# Patient Record
Sex: FEMALE | Race: WHITE
[De-identification: names, ages, dates, MRNs, and addresses within clinical notes are randomized per-mention and may not be internally consistent; named-entity substitution may affect disease eponyms.]

---

## 2018-01-09 LAB
ALBUMIN SERPL-MCNC: 4 GM/DL (ref 3.4–5)
BASOPHILS # BLD: 0.01 K/UL (ref 0–0.2)
BASOPHILS NFR BLD: 0.2 %
BUN SERPL-MCNC: 13 MG/DL (ref 7–18)
CALCIUM SERPL-MCNC: 9.7 MG/DL (ref 8.5–10.1)
CO2 SERPL-SCNC: 29 MMOL/L (ref 21–32)
CREAT SERPL-MCNC: 0.99 MG/DL (ref 0.6–1.2)
EOS ABS #: 0.14 K/UL (ref 0–0.5)
EOSINOPHIL NFR BLD AUTO: 225 K/UL (ref 130–400)
GLUCOSE SERPL-MCNC: 168 MG/DL (ref 70–99)
HBA1C MFR BLD: 5.7 % (ref 4.5–5.6)
HCT VFR BLD CALC: 38.5 % (ref 37–47)
HGB BLD-MCNC: 13.3 G/DL (ref 12–16)
IG#: 0.02 K/UL (ref 0–0.02)
IMM GRANULOCYTES NFR BLD AUTO: 27.6 %
INR PPP: 1 (ref 0.9–1.1)
LYMPHOCYTES # BLD: 1.67 K/UL (ref 1.2–3.4)
MCH RBC QN AUTO: 31.3 PG (ref 25–34)
MCHC RBC AUTO-ENTMCNC: 34.5 G/DL (ref 32–36)
MCV RBC AUTO: 90.6 FL (ref 80–100)
MONO ABS #: 0.4 K/UL (ref 0.11–0.59)
MONOCYTES NFR BLD: 6.6 %
NEUT ABS #: 3.82 K/UL (ref 1.4–6.5)
NEUTROPHILS # BLD AUTO: 2.3 %
NEUTROPHILS NFR BLD AUTO: 63 %
PMV BLD AUTO: 10.2 FL (ref 7.4–10.4)
POTASSIUM SERPL-SCNC: 3.6 MMOL/L (ref 3.5–5.1)
PTT PATIENT: 27.5 SECONDS (ref 21–31)
RED CELL DISTRIBUTION WIDTH CV: 13.6 % (ref 11.5–14.5)
RED CELL DISTRIBUTION WIDTH SD: 44.9 FL (ref 36.4–46.3)
SODIUM SERPL-SCNC: 136 MMOL/L (ref 136–145)
WBC # BLD AUTO: 6.06 K/UL (ref 4.8–10.8)

## 2018-01-09 NOTE — DIAGNOSTIC IMAGING REPORT
CHEST 2 VIEWS ROUTINE



CLINICAL HISTORY: PAT preoperative evaluation



COMPARISON STUDY:  No previous studies for comparison.



FINDINGS: The bones soft tissues and hemidiaphragms are normal. The

cardiomediastinal silhouette is normal. The lungs are clear. The pulmonary

vasculature is normal. 



IMPRESSION:  Negative chest. 











The above report was generated using voice recognition software.  It may contain

grammatical, syntax or spelling errors.









Electronically signed by:  Enrique Fernández M.D.

1/9/2018 2:02 PM



Dictated Date/Time:  1/9/2018 2:01 PM

## 2018-01-16 NOTE — HISTORY & PHYSICAL EXAMINATION
DATE OF ADMISSION:  02/05/2018

 

CHIEF COMPLAINT:  Left knee pain.

 

HISTORY OF PRESENT ILLNESS:  Mr. Gutierrez is a 67-year-old female with a

3-year history of left knee pain.  The patient rates her pain at 8/10.  She

has pain with her daily activities.  She has limited standing and walking

tolerance.  Pain is worse with weightbearing.  The patient has had previous

knee arthroscopy, injections and anti-inflammatories without relief.  She has

failed conservative treatment and is scheduled for left knee replacement.

 

PAST MEDICAL HISTORY:  Hypertension and hypercholesterolemia.  She denies

heart disease, diabetes or DVT.

 

PAST SURGICAL HISTORY:  Tonsillectomy and knee arthroscopy.

 

SOCIAL HISTORY:  The patient denies alcohol or tobacco use.  She lives in a

single story home.  She is  and retired.

 

FAMILY HISTORY:  Negative for DVT.

 

MEDICATIONS:  Omeprazole 20 mg daily, simvastatin 5 mg daily, omega 3 fish

oil 1000 mg daily, aspirin 81 mg, and lisinopril 10 mg.

 

ALLERGIES:  KENALOG.

 

REVIEW OF SYSTEMS:  See HPI.  Ten other systems reviewed, all negative.

 

PHYSICAL EXAMINATION:

VITAL SIGNS:  Height 5 feet 4 inches, weight 187 pounds, and BMI is 32.

GENERAL:  This is a well-developed and well-nourished female who is alert and

oriented x3.  Mood and affect are appropriate.

HEENT:  Normocephalic and atraumatic.  Mucous membranes are moist and intact.

NECK:  Supple without lymphadenopathy.

HEART:  Regular rate and rhythm without murmurs, rubs or gallops.

LUNGS:  Clear to auscultation without wheezes or rhonchi.

ABDOMEN:  Soft and nontender.  Bowel sounds are equal and active.

EXTREMITIES:  No ecchymosis, redness or warmth.  She has varus deformity. 

Range of motion is from 0-110 degrees with no laxity.  She has mild effusion.

 She is neurovascularly intact.

 

X-RAY EXAMINATION:  AP and lateral views show joint space narrowing and

osteophyte formation.

 

IMPRESSION:  Degenerative joint disease, left knee.

 

PLAN:  The patient will be admitted for a left total knee arthroplasty.  We

will plan on aspirin for DVT prophylaxis.  The patient will have [facility]

for postop PT.  A referral has been placed preoperatively.

## 2018-02-05 ENCOUNTER — HOSPITAL ENCOUNTER (INPATIENT)
Dept: HOSPITAL 45 - C.ACU | Age: 68
LOS: 1 days | Discharge: HOME HEALTH | DRG: 470 | End: 2018-02-06
Payer: COMMERCIAL

## 2018-02-05 VITALS
HEIGHT: 63 IN | WEIGHT: 185.63 LBS | BODY MASS INDEX: 32.89 KG/M2 | BODY MASS INDEX: 32.89 KG/M2 | WEIGHT: 185.63 LBS | HEIGHT: 63 IN

## 2018-02-05 VITALS
DIASTOLIC BLOOD PRESSURE: 92 MMHG | HEART RATE: 81 BPM | SYSTOLIC BLOOD PRESSURE: 148 MMHG | OXYGEN SATURATION: 97 % | TEMPERATURE: 97.52 F

## 2018-02-05 VITALS
TEMPERATURE: 98.06 F | OXYGEN SATURATION: 100 % | HEART RATE: 81 BPM | DIASTOLIC BLOOD PRESSURE: 82 MMHG | SYSTOLIC BLOOD PRESSURE: 126 MMHG

## 2018-02-05 VITALS — SYSTOLIC BLOOD PRESSURE: 132 MMHG | HEART RATE: 81 BPM | DIASTOLIC BLOOD PRESSURE: 85 MMHG | OXYGEN SATURATION: 100 %

## 2018-02-05 VITALS — DIASTOLIC BLOOD PRESSURE: 82 MMHG | OXYGEN SATURATION: 100 % | HEART RATE: 88 BPM | SYSTOLIC BLOOD PRESSURE: 136 MMHG

## 2018-02-05 VITALS — HEART RATE: 69 BPM | DIASTOLIC BLOOD PRESSURE: 69 MMHG | SYSTOLIC BLOOD PRESSURE: 115 MMHG | TEMPERATURE: 97.52 F

## 2018-02-05 VITALS
OXYGEN SATURATION: 98 % | TEMPERATURE: 97.52 F | HEART RATE: 64 BPM | SYSTOLIC BLOOD PRESSURE: 114 MMHG | DIASTOLIC BLOOD PRESSURE: 70 MMHG

## 2018-02-05 VITALS — OXYGEN SATURATION: 99 %

## 2018-02-05 VITALS
SYSTOLIC BLOOD PRESSURE: 164 MMHG | DIASTOLIC BLOOD PRESSURE: 97 MMHG | TEMPERATURE: 98.24 F | HEART RATE: 76 BPM | OXYGEN SATURATION: 99 %

## 2018-02-05 VITALS — OXYGEN SATURATION: 100 % | SYSTOLIC BLOOD PRESSURE: 130 MMHG | HEART RATE: 80 BPM | DIASTOLIC BLOOD PRESSURE: 80 MMHG

## 2018-02-05 VITALS — OXYGEN SATURATION: 98 %

## 2018-02-05 DIAGNOSIS — I10: ICD-10-CM

## 2018-02-05 DIAGNOSIS — E78.5: ICD-10-CM

## 2018-02-05 DIAGNOSIS — Z91.048: ICD-10-CM

## 2018-02-05 DIAGNOSIS — M17.12: Primary | ICD-10-CM

## 2018-02-05 PROCEDURE — 0SRD0J9 REPLACEMENT OF LEFT KNEE JOINT WITH SYNTHETIC SUBSTITUTE, CEMENTED, OPEN APPROACH: ICD-10-PCS

## 2018-02-05 RX ADMIN — CEFAZOLIN SCH MLS/MIN: 10 INJECTION, POWDER, FOR SOLUTION INTRAVENOUS at 16:57

## 2018-02-05 RX ADMIN — ACETAMINOPHEN SCH MG: 500 TABLET, COATED ORAL at 21:00

## 2018-02-05 RX ADMIN — TRANEXAMIC ACID SCH MLS/MIN: 100 INJECTION, SOLUTION INTRAVENOUS at 06:30

## 2018-02-05 RX ADMIN — Medication SCH MG: at 20:59

## 2018-02-05 RX ADMIN — FERROUS GLUCONATE SCH MG: 324 TABLET ORAL at 12:30

## 2018-02-05 RX ADMIN — DEXTROSE MONOHYDRATE, SODIUM CHLORIDE, AND POTASSIUM CHLORIDE SCH MLS/HR: 50; 4.5; 1.49 INJECTION, SOLUTION INTRAVENOUS at 22:22

## 2018-02-05 RX ADMIN — DOCUSATE SODIUM SCH MG: 100 CAPSULE, LIQUID FILLED ORAL at 20:59

## 2018-02-05 RX ADMIN — OXYCODONE HYDROCHLORIDE PRN MG: 5 TABLET ORAL at 11:59

## 2018-02-05 RX ADMIN — ACETAMINOPHEN SCH MG: 500 TABLET, COATED ORAL at 15:02

## 2018-02-05 RX ADMIN — TRANEXAMIC ACID SCH MLS/MIN: 100 INJECTION, SOLUTION INTRAVENOUS at 08:45

## 2018-02-05 RX ADMIN — DEXTROSE MONOHYDRATE, SODIUM CHLORIDE, AND POTASSIUM CHLORIDE SCH MLS/HR: 50; 4.5; 1.49 INJECTION, SOLUTION INTRAVENOUS at 12:29

## 2018-02-05 RX ADMIN — FERROUS GLUCONATE SCH MG: 324 TABLET ORAL at 17:02

## 2018-02-05 NOTE — ANESTHESIOLOGY PROGRESS NOTE
Anesthesia Post Op Note


Date & Time


Feb 5, 2018 at 11:25





Vital Signs


Pain Intensity:  0





Vital Signs Past 12 Hours








  Date Time  Temp Pulse Resp B/P (MAP) Pulse Ox O2 Delivery O2 Flow Rate FiO2


 


2/5/18 11:20  87 18 164/94 100 Nasal Cannula 2 


 


2/5/18 11:10  85 19 139/102 100 Oxymask 10 


 


2/5/18 11:00  94 15 148/93 99 Oxymask 10 


 


2/5/18 10:50 36.0 98 15 148/83 98 Oxymask 10 


 


2/5/18 07:48 36.8 76 18 164/97 99 Room Air  











Notes


Mental Status:  alert / awake / arousable, participated in evaluation


Pt Amnestic to Procedure:  Yes


Nausea / Vomiting:  adequately controlled


Pain:  adequately controlled


Airway Patency, RR, SpO2:  stable & adequate


BP & HR:  stable & adequate


Hydration State:  stable & adequate


Anesthetic Complications:  no major complications apparent

## 2018-02-05 NOTE — OPERATIVE REPORT
DATE OF OPERATION:  02/05/2018

 

PREOPERATIVE DIAGNOSIS:  Osteoarthritis left knee.

 

POSTOPERATIVE DIAGNOSIS:  Osteoarthritis left knee.

 

PROCEDURE:  Left total knee arthroplasty.

 

SURGEON:  Jung Olivier MD.

 

ASSISTANT:  BUZZ Rosales.

 

ANESTHESIA:  General.

 

COMPLICATIONS:  None.

 

IMPLANTS USED:  Femoral size 4, tibial size 4, poly 11, patella size 36.

 

OPERATION AND FINDINGS:  Following induction of general anesthesia, the

patient's left leg was prepped and draped in the usual sterile manner. Limb

was exsanguinated with an Esmarch bandage and tourniquet was inflated to 350

mmHg. A longitudinal incision was made anteriorly. Subcutaneous tissue was

sharply dissected.  Electrocautery was used for hemostasis. Prepatellar bursa

was incised and median parapatellar incision was performed. Patella was

everted and the knee was flexed. Fat pad was removed to aid in visualization

and the anterior and posterior cruciate ligaments were removed. The medial

face of the tibia was cleared of soft tissue first with a Bovie and a Ochoa

elevator. This tissue was retracted posteriorly using a blunt Hohmann. A

Spears retractor was used to expose the synovium above on the anterior

aspect of the femur and this was removed down to bone. The PSI guide was

placed on the distal femur and two pins were placed anteriorly and kept in

position and two additional pins were placed distally and removed. The distal

femoral cutting block was placed in position and the distal femoral cut was

used in the +0 setting.

 

Next, the cutting block was removed and the size 4 block was placed in the

distal end of the femur. Care was taken to ensure appropriate external

rotation and feeler gauge was used to ensure no notching would occur. The

femoral block was centered on the distal femur and in the medial and lateral

direction and was fixed using two bone screws. The gold pins were then

removed.  The oscillating saw was used to create the bone cuts and the distal

femoral cutting block was removed and the reciprocating saw was used to

further trim the femoral cuts as well as a deep in the area for the trochlear

groove. Next, posterior condyle remnants were removed. Following this, a

meniscal clamp and knife were utilized to remove the anterior portion of both

medial and lateral meniscus.  The proximal tibia PSI guide was placed into

position and the proximal tibial cutting guide was screwed into position. The

extra medullary alignment guide was utilized to ensure appropriate alignment.

 The proximal tibia was cut and the proximal tibial cutting block was removed

and this bone fragment was removed. The appropriate guide was used to perform

the notch cut on the distal femur and a lamina  and a cochlear knife

were utilized to finish both medial and lateral meniscectomies to remove any

remnants of the posterior or anterior cruciate ligaments. Following this, the

distal femoral component was impacted into position and blunt Milla was used

to sublux the tibia anteriorly. The proximal tibia was sized and a size 4

tibial tray was chosen as the size to be used. This was put into position and

appropriate external rotation and a double check with extramedullary

alignment guide was performed.  The canal for the tibial stem was prepared

first with a 17 mm drill and then the punch and a mallet and the trial tibial

poly was placed. A 11 poly was chosen the size to be used. It was brought to

extension and the patella was prepared with the patellar reamer. A size 36

component was chosen the size to be used.  The trial component was placed and

knee was taken through a full range of motion and there was found to be no

lateral subluxation of the tibia. No lateral release was required. The trials

were all removed. The final components were obtained and assembled. Cement

was mixed. The knee was thoroughly irrigated and the ortho mix was injected

about the knee joint.  The final components were cemented into position. 

After thoroughly suctioning and drying the bone ends, all excess cement was

removed.  The knee was held in extension while the cement hardened.  The

wound was irrigated and closed over a Hemovac drain. #1 Vicryl was used to

close the extensor mechanism.  Subcutaneous tissues closed using 0 Dexon.

Skin was closed with staples. Sterile dressing of Adaptic, 4 x 4's, sterile

Webril, and Ace was applied.  The patient tolerated the procedure well.

 

Due to the complex nature of the procedure, the entire surgery was performed

with the operational assistance of BUZZ Rosales.  The assistant, under

direct supervision, was involved in the actual performance of all aspects of

the surgical procedure including hemostasis, tissue retraction and incision,

instrument management, patient positioning, and wound closure.

 

 

I attest to the content of the Intraoperative Record and any orders documented therein. Any exception
s are noted below.

## 2018-02-05 NOTE — [facility] POST OPERATIVE BRIEF NOTE
Immediate Operative Summary


Operative Date


Feb 5, 2018.





Pre-Operative Diagnosis





Left Knee Degenerative Joint Disease





Post-Operative Diagnosis





Same as preop





Procedure(s) Performed





Left Total Knee Arthroplasty





Surgeon


Dr. Olivier





Assistant Surgeon(s)


Jet Hubbard PA-C





Estimated Blood Loss


10 ml





Findings


Consistent with Post-Op Diagnosis





Specimens





A. Left Knee Bone and Tissue





Anesthesia Type


MAC Spinal Regional





Complication(s)


none





Disposition


Accompanied Pt To Recover:  no


Disposition:  Recovery Room / PACU

## 2018-02-05 NOTE — DIAGNOSTIC IMAGING REPORT
L KNEE 1 OR 2 VIEWS ROUTINE



CLINICAL HISTORY: AP/LATERAL IN PACU LEFT KNEE joint replacement



COMPARISON: None.



DISCUSSION: Anatomic alignment status post total left knee arthroplasty. Good

contact between prosthetic and underlying bone. Expected soft tissue

postoperative change.



IMPRESSION: Anatomic alignment status post total left knee arthroplasty.











The above report was generated using voice recognition software.  It may contain

grammatical, syntax or spelling errors.







Electronically signed by:  Enrique Fernández M.D.

2/5/2018 11:38 AM



Dictated Date/Time:  2/5/2018 11:37 AM

## 2018-02-06 VITALS
DIASTOLIC BLOOD PRESSURE: 72 MMHG | OXYGEN SATURATION: 99 % | HEART RATE: 71 BPM | TEMPERATURE: 98.24 F | SYSTOLIC BLOOD PRESSURE: 112 MMHG

## 2018-02-06 VITALS
TEMPERATURE: 98.24 F | OXYGEN SATURATION: 99 % | HEART RATE: 71 BPM | SYSTOLIC BLOOD PRESSURE: 112 MMHG | DIASTOLIC BLOOD PRESSURE: 72 MMHG

## 2018-02-06 VITALS
TEMPERATURE: 97.7 F | HEART RATE: 67 BPM | DIASTOLIC BLOOD PRESSURE: 70 MMHG | SYSTOLIC BLOOD PRESSURE: 125 MMHG | OXYGEN SATURATION: 95 %

## 2018-02-06 LAB
BUN SERPL-MCNC: 15 MG/DL (ref 7–18)
CALCIUM SERPL-MCNC: 8.6 MG/DL (ref 8.5–10.1)
CO2 SERPL-SCNC: 25 MMOL/L (ref 21–32)
CREAT SERPL-MCNC: 0.79 MG/DL (ref 0.6–1.2)
EOSINOPHIL NFR BLD AUTO: 200 K/UL (ref 130–400)
GLUCOSE SERPL-MCNC: 133 MG/DL (ref 70–99)
HCT VFR BLD CALC: 28.7 % (ref 37–47)
HGB BLD-MCNC: 10.1 G/DL (ref 12–16)
MCH RBC QN AUTO: 31.4 PG (ref 25–34)
MCHC RBC AUTO-ENTMCNC: 35.2 G/DL (ref 32–36)
MCV RBC AUTO: 89.1 FL (ref 80–100)
PMV BLD AUTO: 10.2 FL (ref 7.4–10.4)
POTASSIUM SERPL-SCNC: 4.1 MMOL/L (ref 3.5–5.1)
RED CELL DISTRIBUTION WIDTH CV: 12.9 % (ref 11.5–14.5)
RED CELL DISTRIBUTION WIDTH SD: 42 FL (ref 36.4–46.3)
SODIUM SERPL-SCNC: 133 MMOL/L (ref 136–145)
WBC # BLD AUTO: 12.18 K/UL (ref 4.8–10.8)

## 2018-02-06 RX ADMIN — FERROUS GLUCONATE SCH MG: 324 TABLET ORAL at 08:30

## 2018-02-06 RX ADMIN — DOCUSATE SODIUM SCH MG: 100 CAPSULE, LIQUID FILLED ORAL at 09:13

## 2018-02-06 RX ADMIN — OXYCODONE HYDROCHLORIDE PRN MG: 5 TABLET ORAL at 10:43

## 2018-02-06 RX ADMIN — ACETAMINOPHEN SCH MG: 500 TABLET, COATED ORAL at 13:03

## 2018-02-06 RX ADMIN — ACETAMINOPHEN SCH MG: 500 TABLET, COATED ORAL at 05:51

## 2018-02-06 RX ADMIN — FERROUS GLUCONATE SCH MG: 324 TABLET ORAL at 13:03

## 2018-02-06 RX ADMIN — CEFAZOLIN SCH MLS/MIN: 10 INJECTION, POWDER, FOR SOLUTION INTRAVENOUS at 03:33

## 2018-02-06 RX ADMIN — Medication SCH MG: at 13:02

## 2018-02-06 RX ADMIN — OXYCODONE HYDROCHLORIDE PRN MG: 5 TABLET ORAL at 16:11

## 2018-02-06 RX ADMIN — DEXTROSE MONOHYDRATE, SODIUM CHLORIDE, AND POTASSIUM CHLORIDE SCH MLS/HR: 50; 4.5; 1.49 INJECTION, SOLUTION INTRAVENOUS at 08:10

## 2018-02-06 NOTE — ANESTHESIOLOGY PROGRESS NOTE
Anesthesia Post Op Note


Date & Time


Feb 6, 2018 at 11:04





Vital Signs





Vital Signs Past 12 Hours








  Date Time  Temp Pulse Resp B/P (MAP) Pulse Ox O2 Delivery O2 Flow Rate FiO2


 


2/6/18 07:30      Room Air  


 


2/6/18 07:16 36.8 71 18 112/72 (85) 99 Room Air  


 


2/6/18 03:06 36.5 67 17 125/70 (88) 95 Room Air  


 


2/5/18 23:36     98 Room Air  


 


2/5/18 23:05 36.4 64 17 114/70 (85) 98 Room Air  











Notes


Mental Status:  alert / awake / arousable, participated in evaluation


Pt Amnestic to Procedure:  Yes


Nausea / Vomiting:  adequately controlled


Pain:  adequately controlled


Airway Patency, RR, SpO2:  stable & adequate


BP & HR:  stable & adequate


Hydration State:  stable & adequate


Anesthetic Complications:  no major complications apparent

## 2018-02-06 NOTE — DISCHARGE INSTRUCTIONS
Discharge Instructions


Date of Service


2018.





Admission


Reason for Admission:  Left Knee Osteoarthritis





Discharge


Discharge Diagnosis / Problem:  Left Knee Djd





Discharge Goals


Goal(s):  Decrease discomfort, Improve function, Increase independence





Activity Recommendations


Activity Limitations:  per Instructions/Follow-up section


Weightbearing Status:  Left weightbearing (as tolerated)





.





Instructions / Follow-Up


Instructions / Follow-Up


ACTIVITY RECOMMENDATIONS:





SELF CARE INSTRUCTIONS AFTER TOTAL KNEE REPLACEMENT





A.  You may need to continue a physical therapy program after discharge from 

the hospital.  There are several options available to you. 


      Your doctor will assist you in selecting the best one for you.





   1.  An out-patient facility 2 to 3 times a week for therapy or home therapy.


   2.  Continue working on all exercises taught to you in the hospital.  Your


                 goals should be to increase bending of your knee to 90 degrees 

and


                 beyond and to fully straighten your knee.





B.  You may progress at your own pace from walking with a walker or crutches to 

a cane; then to no assistive devices.





C.   Make walking a part of your daily routine.  Be up as much as comfortable 

with rest periods throughout the day.  


      Rest with leg elevation is very important. 


      Use the ice wrap frequently for the first 3-4 weeks.





D.  There are no restrictions on activities.  You may ride in a car, shop, 

participate in household chores and all social activities.





E.  Wear the long elastic stockings (INEZ hose) 20 hours a day for 2 weeks after 

surgery.  


     They can be removed several times a day for laundering and for a bath.





F.  You may shower, no tub baths until cleared by your doctor.








SPECIAL CARE INSTRUCTIONS:





**VERY IMPORTANT TO READ AND REVIEW**





A.  There are a few signs you need to watch for after you are home.  Call  

[facility] [place] if you notice any of the followin.  Increased severe knee pain.  Some pain is expected especially  when you 

exercise.


   2.  Increased swelling in your leg or knee; pain or swelling of the calf 

muscle in either lower leg.


   3.  Any fluid drainage from the incision.


   4.  Shortness of breath or chest pain.





B.  Please call [facility] at [phone] if you have any  

concerns or questions about your operation or recovery.  


     The doctor or his nurse will return your call promptly.





C.  You must take antibiotics before dental work, bladder, bowel or other 

surgery.  


      Your doctor will provide you with a permanent care to carry describing 

this precaution.





IMPORTANT:





*  REMEMBER TO TAKE ASPIRIN, 81 MG, TWICE DAILY FOR 4 WEEKS UNLESS OTHERWISE 

DIRECTED.  


   THIS IS YOUR BLOOD THINNER.





*  HIGH RISK PATIENTS MAY BE PRESCRIBED A STRONGER BLOOD THINNER.  


   THIS WILL BE PROVIDED AT DISCHARGE.





*  CALL IF INCREASED PAIN, REDNESS, DRAINAGE OR FEVER GREATER THAT 101.





*  WEAR INEZ HOSE 20 HOURS PER DAY FOR 2 WEEKS.





*  You have a Zipline closure system on your wound.  This will remain on for 14 

days.  Keep the wound covered with gauze (4x4's or similar) to protect the 

wound.


    It will also protect the Zipline from getting caught on your clothes.  You 

may shower in 72 hours.  Do not soak the wound.  No tub baths.  Clean around 


    the wound but do not scrub it.  Call the office [phone] if the wound 

appears to be opening or if the Zipline 


    is coming off before it's time.








FOLLOW UP VISIT:





If appointment is not already scheduled:





Please call [facility] [place] to make a follow-up appointment for 


2 weeks after your surgery at [phone].





Current Hospital Diet


Patient's current hospital diet: Regular Diet





Discharge Diet


Recommended Diet:  Regular Diet





Procedures


Procedures Performed:  


Left Total Knee Arthroplasty





Pending Studies


Studies pending at discharge:  no





Laboratory Results





Hemoglobin A1c








Test


  18


13:33 Range/Units


 


 


Estimated Average Glucose 117   mg/dl


 


Hemoglobin A1c 5.7 H 4.5-5.6  %











Medical Emergencies








.


Who to Call and When:





Medical Emergencies:  If at any time you feel your situation is an emergency, 

please call [phone] immediately.





.





Non-Emergent Contact


Non-Emergency issues call your:  Surgeon


Call Non-Emergent contact if:  temperature is above 101.5, your pain is not 

controlled, your pain is worsening, wound has increased drainage, wound has 

increased redness


.








"Provider Documentation" section prepared by Jet Hubbard.








.





VTE Core Measure


Inpt VTE Proph given/why not?:  Other Anticoagulation, T.E.D. Stockings, SCD's





[place] Drug Monitoring [place]


Search Results:  patient reviewed within database, no issues identified

## 2018-02-06 NOTE — ORTHOPEDIC PROGRESS NOTE
Orthopedic Progress Note


Date of Service


Feb 6, 2018.





Subjective


Post OP Day:  1


Reports: feeling well, Denies: complaints





Objective


calves soft nontender, N/V intact, dressing C/D/I, A&O x3, toes mobile, hemovac 

drainage (100ml  latest shift)











  Date Time  Temp Pulse Resp B/P (MAP) Pulse Ox O2 Delivery O2 Flow Rate FiO2


 


2/6/18 07:30      Room Air  


 


2/6/18 07:16 36.8 71 18 112/72 (85) 99 Room Air  


 


2/6/18 03:06 36.5 67 17 125/70 (88) 95 Room Air  


 


2/5/18 23:36     98 Room Air  


 


2/5/18 23:05 36.4 64 17 114/70 (85) 98 Room Air  


 


2/5/18 19:03 36.4 69 18 115/69 (84)  Room Air 99.0 


 


2/5/18 14:55     99 Room Air  


 


2/5/18 14:53 36.7 81 18 126/82 (97) 100 Nasal Cannula 2.0 


 


2/5/18 13:46  81 16 132/85 (101) 100 Nasal Cannula 2.0 


 


2/5/18 12:52  88 16 136/82 (100) 100 Nasal Cannula 2.0 


 


2/5/18 12:10  80 16 130/80 (97) 100 Nasal Cannula 2.0 


 


2/5/18 11:40     97 Nasal Cannula 2.0 


 


2/5/18 11:40 36.4 81 16 148/92 (110) 97 Nasal Cannula 2.0 


 


2/5/18 11:40      Nasal Cannula 2.0 


 


2/5/18 11:30 36.2 84 12 158/87 100 Nasal Cannula 2 


 


2/5/18 11:20  87 18 164/94 100 Nasal Cannula 2 


 


2/5/18 11:10  85 19 139/102 100 Oxymask 10 


 


2/5/18 11:00  94 15 148/93 99 Oxymask 10 


 


2/5/18 10:50 36.0 98 15 148/83 98 Oxymask 10 








Laboratory Results 24 Hours:











Test


  2/6/18


05:53


 


Hematocrit 28.7 % 


 


Hemoglobin 10.1 g/dL 











Assessment & Plan


Assessment:


POD 1 s/p Left TKA


Plan:


PT/OT


Planning for  services








Inhouse Planning


Pain Management:  Ultram, Morphine, PO Tylenol, Oxy IR


DVT Prophylaxis:  TEDs, SCDs, ASA





Discharge Planning


Discharge Planning:  home with home health

## 2018-02-12 NOTE — DISCHARGE SUMMARY
DISCHARGE DIAGNOSIS:  Degenerative joint disease, left knee.

 

SECONDARY DIAGNOSES:  Hypertension and hypercholesterolemia.

 

CONSULTS:  None.

 

COMPLICATIONS:  None.

 

PROCEDURES:  Left total knee arthroplasty performed by Dr. Olivier on

02/05/2018.

 

BRIEF HISTORY:  As dictated in history and physical.

 

HOSPITAL SUMMARY:  The patient was admitted on the above date and had the

above-noted surgery performed which she tolerated well.  On the first

postoperative day, she was feeling well and had no complaints.  Calves were

soft, nontender, neurovascularly intact.  Dressings clean, dry and intact. 

Toes were mobile.  Vital signs were stable.  She was afebrile.  Hemoglobin

was 10.1 and she was started on physical therapy protocol and continued on

DVT prophylaxis and pain management.  She was progressing well with her

physical therapy, obtaining 90 degrees of flexion in the knee and ambulating

500 feet with a rolling walker and patient was going up and down steps with

the hand rolled.  She was otherwise remaining stable and it was felt that she

could be discharged to home.  For further review, please see chart.

 

LABORATORY AND X-RAY DATA:  As per chart.

 

DISCHARGE INSTRUCTIONS:  The patient was discharged to home in satisfactory

condition on 02/06/2018.

 

DIET:  Regular.

 

ACTIVITY:  Follow TK instruction sheets and special care instructions as

noted, weightbearing as tolerated left lower extremity and follow up with Dr. Olivier in 2 weeks.  The patient to call for appointment if one has not been

made for you.

 

DISCHARGE MEDICATIONS:  Acetaminophen 1000 mg p.o. q. 8 hours for 21 days,

oxycodone 5-10 mg p.o. q. 4 hours p.r.n., senna 17.2 mg at bedtime.  Resume

home meds as listed in discharge instructions sheet; aspirin 81 mg p.o.

b.i.d. for 30 days, after 30 days, resume once daily dosing.  Stop taking her

old Tylenol dosage; stop taking ibuprofen and naproxen.

## 2019-03-15 NOTE — PAT MEDICATION INSTRUCTIONS
Service Date


Jan 9, 2018.





Current Home Medication List


Acetaminophen (Tylenol), 650 MG PO Q6 PRN for Pain


Aspirin (Aspirin Ec), 81 MG PO QPM


Calcium/Vitamin D (Os-Ivan 500 Plus D), 1 TAB PO QAM


Ibuprofen Tab (Motrin), 400 MG PO TID PRN for Pain


Lisinopril/Hctz (Zestoretic 20MG/12.5MG), 1 TAB PO QAM


Multivitamin (Multivitamin), 1 TAB PO QAM


Naproxen (Aleve), 220 MG PO Q12 PRN for Pain


Omega-3 Fatty Acids (Omega 3), 1 CAP PO NOON


Ranitidine Hcl (Zantac), 150 MG PO QAM


Red Yeast Rice Extract (Red Yeast Rice), 1 CAP PO QPM


Simvastatin (Zocor), 5 MG PO QPM





Medication Instructions


For Your Scheduled Surgery 





- Hold the following medications 2 weeks prior to surgery:


Omega-3 Fatty Acids (Omega 3), 1 CAP PO NOON


Red Yeast Rice Extract (Red Yeast Rice), 1 CAP PO QPM








- Hold the following medications the morning of surgery:


Lisinopril/Hctz (Zestoretic 20MG/12.5MG), 1 TAB PO QAM


Multivitamin (Multivitamin), 1 TAB PO QAM


Calcium/Vitamin D (Os-Ivan 500 Plus D), 1 TAB PO QAM


Ibuprofen Tab (Motrin), 400 MG PO TID PRN for Pain (otherwise okay to continue 

per surgeon)


Naproxen (Aleve), 220 MG PO Q12 PRN for Pain (otherwise okay to continue per 

surgeon)








- Take the following medications the morning of surgery with a sip of water 

OTHERWISE NOTHING TO EAT OR DRINK AFTER MIDNIGHT:


Ranitidine Hcl (Zantac), 150 MG PO QAM


Acetaminophen (Tylenol), 650 MG PO Q6 PRN for Pain (may take if needed up to 4 

hours prior to surgery)








- Take the following medications as scheduled the night before surgery:


Simvastatin (Zocor), 5 MG PO QPM


Aspirin (Aspirin Ec), 81 MG PO QPM


Acetaminophen (Tylenol), 650 MG PO Q6 PRN for Pain








If you have any questions please call us at [phone] or [phone] or 

[phone] Alert and oriented, no focal deficits, no motor or sensory deficits.